# Patient Record
Sex: FEMALE | Race: ASIAN | NOT HISPANIC OR LATINO | Employment: OTHER | URBAN - METROPOLITAN AREA
[De-identification: names, ages, dates, MRNs, and addresses within clinical notes are randomized per-mention and may not be internally consistent; named-entity substitution may affect disease eponyms.]

---

## 2017-04-20 ENCOUNTER — OFFICE VISIT (OUTPATIENT)
Dept: OPHTHALMOLOGY | Facility: CLINIC | Age: 82
End: 2017-04-20
Payer: COMMERCIAL

## 2017-04-20 DIAGNOSIS — H52.4 PRESBYOPIA: ICD-10-CM

## 2017-04-20 DIAGNOSIS — Z96.1 PSEUDOPHAKIA: ICD-10-CM

## 2017-04-20 DIAGNOSIS — H26.9 CATARACT: Primary | ICD-10-CM

## 2017-04-20 PROCEDURE — 92014 COMPRE OPH EXAM EST PT 1/>: CPT | Performed by: OPHTHALMOLOGY

## 2017-04-20 PROCEDURE — 92015 DETERMINE REFRACTIVE STATE: CPT | Performed by: OPHTHALMOLOGY

## 2017-04-20 ASSESSMENT — CONF VISUAL FIELD
OS_NORMAL: 1
OD_NORMAL: 1

## 2017-04-20 ASSESSMENT — REFRACTION_WEARINGRX
OS_SPHERE: +0.75
OD_ADD: +2.75
OS_AXIS: 031
OD_AXIS: 150
OS_ADD: +2.75
OD_CYLINDER: +0.25
SPECS_TYPE: BIFOCAL
OD_SPHERE: -0.75
OS_CYLINDER: +1.00

## 2017-04-20 ASSESSMENT — REFRACTION_MANIFEST
OS_AXIS: 030
OS_ADD: +3.00
OS_SPHERE: +0.75
OD_SPHERE: -1.00
OD_ADD: +3.00
OD_CYLINDER: +0.25
OD_AXIS: 155
OS_CYLINDER: +1.50

## 2017-04-20 ASSESSMENT — VISUAL ACUITY
METHOD: SNELLEN - LINEAR
OD_CC: 20/20
OS_CC: 20/20
OS_CC+: -2
CORRECTION_TYPE: GLASSES

## 2017-04-20 ASSESSMENT — SLIT LAMP EXAM - LIDS
COMMENTS: 2+ DERMATOCHALASIS - UPPER LID
COMMENTS: 2+ DERMATOCHALASIS - UPPER LID

## 2017-04-20 ASSESSMENT — EXTERNAL EXAM - RIGHT EYE: OD_EXAM: NORMAL

## 2017-04-20 ASSESSMENT — CUP TO DISC RATIO
OS_RATIO: 0.4
OD_RATIO: 0.4

## 2017-04-20 ASSESSMENT — TONOMETRY
OS_IOP_MMHG: 16
OD_IOP_MMHG: 15
IOP_METHOD: APPLANATION

## 2017-04-20 ASSESSMENT — EXTERNAL EXAM - LEFT EYE: OS_EXAM: MILD BROW

## 2017-04-20 NOTE — PROGRESS NOTES
Current Eye Medications: Systane twice a day , sometimes more.     Subjective:  Here for complete today.  Blurs when she reads sometimes, eyes tire.  Optical shop was a bad experience, they told her to tell the tech that she wants a normal pair of glasses.   Not sure what this means.      Objective:  See Ophthalmology Exam.       Assessment:  Stable eye exam.  Cataract left eye not yet visually significant.      ICD-10-CM    1. Cataract, mild-mod, os H26.9 EYE EXAM (SIMPLE-NONBILLABLE)   2. Pseudophakia, od Z96.1    3. Presbyopia H52.4 REFRACTIVE STATUS        Plan: Possible clouding of posterior capsule discussed.   Possible posterior vitreous detachment (sudden onset large floater and/or flashing lights) discussed.   Glasses Rx given - optional   Use artificial tears up to 4 times daily both eyes. (Refresh Tears or Systane Ultra/Balance)   Call in December 2017 for an appointment in April 2018 for Complete Exam    Dr. Paige [phone]

## 2017-04-20 NOTE — PATIENT INSTRUCTIONS
Possible clouding of posterior capsule discussed.   Possible posterior vitreous detachment (sudden onset large floater and/or flashing lights) discussed.   Glasses Rx given - optional   Use artificial tears up to 4 times daily both eyes. (Refresh Tears or Systane Ultra/Balance)   Call in December 2017 for an appointment in April 2018 for Complete Exam    Dr. Paige [phone]

## 2017-04-20 NOTE — MR AVS SNAPSHOT
"              After Visit Summary   4/20/2017    Ana Garner    MRN: [id number]           Patient Information     Date Of Birth          1935        Visit Information        Provider Department      4/20/2017 9:30 AM Ravindra Paige MD [facility]        Today's Diagnoses     Presbyopia    -  1    Cataract, mild-mod, os        Pseudophakia, od          Care Instructions    Possible clouding of posterior capsule discussed.   Possible posterior vitreous detachment (sudden onset large floater and/or flashing lights) discussed.   Glasses Rx given - optional   Use artificial tears up to 4 times daily both eyes. (Refresh Tears or Systane Ultra/Balance)   Call in December 2017 for an appointment in April 2018 for Complete Exam    Dr. Paige [phone]        Follow-ups after your visit        Who to contact     If you have questions or need follow up information about today's clinic visit or your schedule please contact [facility] directly at [phone].  Normal or non-critical lab and imaging results will be communicated to you by Curoversehart, letter or phone within 4 business days after the clinic has received the results. If you do not hear from us within 7 days, please contact the clinic through Experts 911t or phone. If you have a critical or abnormal lab result, we will notify you by phone as soon as possible.  Submit refill requests through Motion[employer] or call your pharmacy and they will forward the refill request to us. Please allow 3 business days for your refill to be completed.          Additional Information About Your Visit        MyChart Information     Motionloft lets you send messages to your doctor, view your test results, renew your prescriptions, schedule appointments and more. To sign up, go to [email or web address] . Click on \"Log in\" on the left side of the screen, which will take you to the Welcome page. Then click on \"Sign up Now\" on the right side of the page.     You " will be asked to enter the access code listed below, as well as some personal information. Please follow the directions to create your username and password.     Your access code is: [email or web address]  Expires: 2017 10:49 AM     Your access code will  in 90 days. If you need help or a new code, please call your [place] clinic or [phone].        Care EveryWhere ID     This is your Care EveryWhere ID. This could be used by other organizations to access your [place] medical records  [id number]         Blood Pressure from Last 3 Encounters:   No data found for BP    Weight from Last 3 Encounters:   No data found for Wt              Today, you had the following     No orders found for display       Primary Care Provider Office Phone # Fax #    Darian Marina [phone]       [email or web address] [address] [facility] [address]  [facility] [address]        Thank you!     Thank you for choosing [facility]  for your care. Our goal is always to provide you with excellent care. Hearing back from our patients is one way we can continue to improve our services. Please take a few minutes to complete the written survey that you may receive in the mail after your visit with us. Thank you!             Your Updated Medication List - Protect others around you: Learn how to safely use, store and throw away your medicines at www.disposemymeds.org.          This list is accurate as of: 17 10:49 AM.  Always use your most recent med list.                   Brand Name Dispense Instructions for use    AF ALLERGY RELIEF PO      Take  by mouth.       ascorbic acid 500 MG tablet    VITAMIN C     Take 100 mg by mouth daily       B COMPLEX VITAMINS PLUS PO      Take  by mouth.       CALCIUM CITRATE PLUS PO      Take 1 tablet by mouth 3 times daily. Contains Magnesium, Zinc, B-6, Copper, Vit D3       cholecalciferol 1000 UNIT tablet    vitamin D     Take  by mouth.       CHOLEST OFF PO      Take  by  mouth.       hypromellose 0.3 % Gel ophthalmic gel    GENTEAL     Place 1 drop into both eyes 2 times daily       Levothyroxine Sodium 25 MCG Caps      Take  by mouth.       magnesium 250 MG tablet      Take 1 tablet by mouth daily.       OLIVE LEAF PO      Take 400 mg by mouth.       OMEGA-3 FISH OIL PO

## 2018-04-23 ENCOUNTER — OFFICE VISIT (OUTPATIENT)
Dept: OPHTHALMOLOGY | Facility: CLINIC | Age: 83
End: 2018-04-23
Payer: COMMERCIAL

## 2018-04-23 DIAGNOSIS — Z96.1 PSEUDOPHAKIA: Primary | ICD-10-CM

## 2018-04-23 DIAGNOSIS — H52.4 PRESBYOPIA: ICD-10-CM

## 2018-04-23 DIAGNOSIS — H25.812 COMBINED FORMS OF AGE-RELATED CATARACT OF LEFT EYE: ICD-10-CM

## 2018-04-23 PROCEDURE — 92014 COMPRE OPH EXAM EST PT 1/>: CPT | Performed by: OPHTHALMOLOGY

## 2018-04-23 PROCEDURE — 92015 DETERMINE REFRACTIVE STATE: CPT | Performed by: OPHTHALMOLOGY

## 2018-04-23 ASSESSMENT — REFRACTION_MANIFEST
OD_AXIS: 133
OD_ADD: +3.00
OD_SPHERE: -0.50
OD_CYLINDER: +0.25
OS_SPHERE: +0.75
OS_CYLINDER: +1.25
OS_ADD: +3.00
OS_AXIS: 018

## 2018-04-23 ASSESSMENT — REFRACTION_WEARINGRX
OS_SPHERE: +0.75
OS_AXIS: 033
OD_AXIS: 140
OS_CYLINDER: +1.25
OD_SPHERE: -0.75
SPECS_TYPE: BIFOCAL
OS_ADD: +3.00
OD_CYLINDER: +0.25
OD_ADD: +3.00

## 2018-04-23 ASSESSMENT — VISUAL ACUITY
OS_CC: 2
OD_CC+: -3
OS_CC: 20/25
OD_CC: 20/20
METHOD: SNELLEN - LINEAR
CORRECTION_TYPE: GLASSES
OD_CC: 2
OS_CC+: -3

## 2018-04-23 ASSESSMENT — TONOMETRY
OD_IOP_MMHG: 15
IOP_METHOD: APPLANATION
OS_IOP_MMHG: 15

## 2018-04-23 ASSESSMENT — SLIT LAMP EXAM - LIDS
COMMENTS: 2+ DERMATOCHALASIS - UPPER LID
COMMENTS: 2+ DERMATOCHALASIS - UPPER LID

## 2018-04-23 ASSESSMENT — EXTERNAL EXAM - RIGHT EYE: OD_EXAM: NORMAL

## 2018-04-23 ASSESSMENT — CONF VISUAL FIELD
OD_NORMAL: 1
OS_NORMAL: 1

## 2018-04-23 ASSESSMENT — EXTERNAL EXAM - LEFT EYE: OS_EXAM: MILD BROW

## 2018-04-23 ASSESSMENT — CUP TO DISC RATIO
OS_RATIO: 0.4
OD_RATIO: 0.4

## 2018-04-23 NOTE — PATIENT INSTRUCTIONS
Possible clouding of posterior capsule discussed right eye.  Possible posterior vitreous detachment (sudden onset large floater and/or flashing lights) discussed.   Use artificial tears up to 4 times daily both eyes. (Refresh Tears, Systane Ultra/Balance, or Theratears)   Glasses Rx given - optional   Call in December 2018 for an appointment in April 2019 for Complete Exam.    Dr. Paige [phone]

## 2018-04-23 NOTE — PROGRESS NOTES
Current Eye Medications:  systane ulta both eyes twice a day.       Subjective:  Comprehensive Eye Exam.  Her frame may be broken (the temple).  She relates a delay in focusing after she has been reading, then looks across the room (she complained of this last year).       Objective:  See Ophthalmology Exam.       Assessment:  Stable eye exam.      ICD-10-CM    1. Pseudophakia, od Z96.1 EYE EXAM (SIMPLE-NONBILLABLE)   2. 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 Presbyopia H52.4 REFRACTIVE STATUS        Plan:  Possible clouding of posterior capsule discussed right eye.  Possible posterior vitreous detachment (sudden onset large floater and/or flashing lights) discussed.   Use artificial tears up to 4 times daily both eyes. (Refresh Tears, Systane Ultra/Balance, or Theratears)   Glasses Rx given - optional   Call in December 2018 for an appointment in April 2019 for Complete Exam.    Dr. Paige [phone]

## 2018-04-23 NOTE — MR AVS SNAPSHOT
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  Possible posterior vitreous detachment (sudden onset large floater and/or flashing lights) discussed.   Use artificial tears up to 4 times daily both eyes. (Refresh Tears, Systane Ultra/Balance, or Theratears)   Glasses Rx given - optional   Call in December 2018 for an appointment in April 2019 for Complete Exam.    Dr. Paige [phone]          Follow-ups after your visit        Who to contact     If you have questions or need follow up information about today's clinic visit or your schedule please contact [facility] directly at [phone].  Normal or non-critical lab and imaging results will be communicated to you by Fonemeshhart, letter or phone within 4 business days after the clinic has received the results. If you do not hear from us within 7 days, please contact the clinic through Fonemeshhart or phone. If you have a critical or abnormal lab result, we will notify you by phone as soon as possible.  Submit refill requests through [employer] or call your pharmacy and they will forward the refill request to us. Please allow 3 business days for your refill to be completed.          Additional Information About Your Visit        FonemeshharMela [employer]isans Information     [employer] lets you send messages to your doctor, view your test results, renew your prescriptions, schedule appointments and more. To sign up, go to [email or web address] . Click on \"Log in\" on the left side of the screen, which will take you to the Welcome page. Then " "click on \"Sign up Now\" on the right side of the page.     You will be asked to enter the access code listed below, as well as some personal information. Please follow the directions to create your username and password.     Your access code is: [email or web address]  Expires: 2018 10:48 AM     Your access code will  in 90 days. If you need help or a new code, please call your [place] clinic or [phone].        Care EveryWhere ID     This is your Care EveryWhere ID. This could be used by other organizations to access your [place] medical records  [id number]         Blood Pressure from Last 3 Encounters:   No data found for BP    Weight from Last 3 Encounters:   No data found for Wt              We Performed the Following     EYE EXAM (SIMPLE-NONBILLABLE)     REFRACTIVE STATUS        Primary Care Provider Office Phone # Fax #    Darian Marina [phone]       [facility] [address] [facility] [address]  [facility] [address]        Equal Access to Services     [facility]: Hadii aad ku hadasho Soomaali, waaxda luqadaha, qaybta kaalmada adeegyada, waxay idiin hayaan ruieg kharaonofre toussaint . So [facility] [phone].    ATENCIÓN: Si habla español, tiene a danielle disposición servicios gratuitos de asistencia lingüística. Llame al [phone].    We comply with applicable federal civil rights laws and [place] laws. We do not discriminate on the basis of race, color, national origin, age, disability, sex, sexual orientation, or gender identity.            Thank you!     Thank you for choosing [facility]  for your care. Our goal is always to provide you with excellent care. Hearing back from our patients is one way we can continue to improve our services. Please take a few minutes to complete the written survey that you may receive in the mail after your visit with us. Thank you!             Your Updated Medication List - Protect others around you: Learn how to safely use, store and " throw away your medicines at www.disposemymeds.org.          This list is accurate as of 4/23/18 10:48 AM.  Always use your most recent med list.                   Brand Name Dispense Instructions for use Diagnosis    AF ALLERGY RELIEF PO      Take  by mouth.        ascorbic acid 500 MG tablet    VITAMIN C     Take 100 mg by mouth daily        B COMPLEX VITAMINS PLUS PO      Take  by mouth.        CALCIUM CITRATE PLUS PO      Take 1 tablet by mouth 3 times daily. Contains Magnesium, Zinc, B-6, Copper, Vit D3        cholecalciferol 1000 UNIT tablet    vitamin D3     Take  by mouth.        CHOLEST OFF PO      Take  by mouth.    Myopia, Presbyopia, Hyperopia, Astigmatism       hypromellose 0.3 % Gel ophthalmic gel    GENTEAL     Place 1 drop into both eyes 2 times daily        Levothyroxine Sodium 25 MCG Caps      Take  by mouth.        magnesium 250 MG tablet      Take 1 tablet by mouth daily.        OLIVE LEAF PO      Take 400 mg by mouth.        OMEGA-3 FISH OIL PO

## 2018-04-23 NOTE — LETTER
4/23/2018         RE: Ana Garner  [address] [address]  [place] [address]        Dear Colleague,    Thank you for referring your patient, Ana Garner, to the [facility] [place] [facility]\"". Please see a copy of my visit note below.     Current Eye Medications:  systane ulta both eyes twice a day.       Subjective:  Comprehensive Eye Exam.  Her frame may be broken (the temple).  She relates a delay in focusing after she has been reading, then looks across the room (she complained of this last year).       Objective:  See Ophthalmology Exam.       Assessment:  Stable eye exam.      ICD-10-CM    1. Pseudophakia, od Z96.1 EYE EXAM (SIMPLE-NONBILLABLE)   2. 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 Presbyopia H52.4 REFRACTIVE STATUS        Plan:  Possible clouding of posterior capsule discussed right eye.  Possible posterior vitreous detachment (sudden onset large floater and/or flashing lights) discussed.   Use artificial tears up to 4 times daily both eyes. (Refresh Tears, Systane Ultra/Balance, or Theratears)   Glasses Rx given - optional   Call in December 2018 for an appointment in April 2019 for Complete Exam.    Dr. Paige [phone]         Again, thank you for allowing me to participate in the care of your patient.        Sincerely,        Ravindra Paige MD

## 2019-05-29 ENCOUNTER — OFFICE VISIT (OUTPATIENT)
Dept: OPHTHALMOLOGY | Facility: CLINIC | Age: 84
End: 2019-05-29
Payer: MEDICARE

## 2019-05-29 DIAGNOSIS — H52.4 PRESBYOPIA: ICD-10-CM

## 2019-05-29 DIAGNOSIS — H25.812 COMBINED FORMS OF AGE-RELATED CATARACT OF LEFT EYE: Primary | ICD-10-CM

## 2019-05-29 DIAGNOSIS — Z96.1 PSEUDOPHAKIA: ICD-10-CM

## 2019-05-29 PROBLEM — R41.3 MEMORY LOSS: Status: ACTIVE | Noted: 2018-10-18

## 2019-05-29 PROBLEM — R53.83 OTHER MALAISE AND FATIGUE: Status: ACTIVE | Noted: 2018-10-18

## 2019-05-29 PROBLEM — R63.4 WEIGHT LOSS: Status: ACTIVE | Noted: 2018-10-18

## 2019-05-29 PROBLEM — F51.01 PRIMARY INSOMNIA: Status: ACTIVE | Noted: 2017-10-25

## 2019-05-29 PROBLEM — M81.0 AGE-RELATED OSTEOPOROSIS WITHOUT CURRENT PATHOLOGICAL FRACTURE: Status: ACTIVE | Noted: 2018-10-18

## 2019-05-29 PROBLEM — E78.2 MIXED HYPERLIPIDEMIA: Status: ACTIVE | Noted: 2017-10-25

## 2019-05-29 PROBLEM — D72.819 LEUKOCYTOPENIA: Status: ACTIVE | Noted: 2018-10-18

## 2019-05-29 PROBLEM — H91.90 HEARING LOSS: Status: ACTIVE | Noted: 2018-10-18

## 2019-05-29 PROBLEM — E03.9 ACQUIRED HYPOTHYROIDISM: Status: ACTIVE | Noted: 2018-10-18

## 2019-05-29 PROBLEM — B02.29 HERPES ZOSTER WITH NERVOUS SYSTEM COMPLICATIONS: Status: ACTIVE | Noted: 2018-10-18

## 2019-05-29 PROBLEM — G60.9 IDIOPATHIC PERIPHERAL NEUROPATHY: Status: ACTIVE | Noted: 2018-10-18

## 2019-05-29 PROBLEM — R53.81 OTHER MALAISE AND FATIGUE: Status: ACTIVE | Noted: 2018-10-18

## 2019-05-29 PROCEDURE — 92014 COMPRE OPH EXAM EST PT 1/>: CPT | Performed by: OPHTHALMOLOGY

## 2019-05-29 PROCEDURE — 92015 DETERMINE REFRACTIVE STATE: CPT | Mod: GY | Performed by: OPHTHALMOLOGY

## 2019-05-29 ASSESSMENT — REFRACTION_WEARINGRX
OD_AXIS: 138
OD_CYLINDER: +0.25
OS_AXIS: 020
OS_ADD: +3.25
OS_SPHERE: +0.50
OD_ADD: +3.25
OD_SPHERE: -0.75
OS_CYLINDER: +1.50
SPECS_TYPE: BIFOCAL

## 2019-05-29 ASSESSMENT — CUP TO DISC RATIO
OS_RATIO: 0.4
OD_RATIO: 0.4

## 2019-05-29 ASSESSMENT — EXTERNAL EXAM - LEFT EYE: OS_EXAM: MILD BROW

## 2019-05-29 ASSESSMENT — REFRACTION_MANIFEST
OS_SPHERE: +1.00
OS_ADD: +3.00
OD_AXIS: 150
OD_CYLINDER: +0.50
OD_SPHERE: -0.75
OS_AXIS: 028
OS_CYLINDER: +1.50
OD_ADD: +3.00

## 2019-05-29 ASSESSMENT — VISUAL ACUITY
OD_CC: 2
CORRECTION_TYPE: GLASSES
METHOD: SNELLEN - LINEAR
OS_CC: 2
OS_CC: 20/40
OS_PH_CC: 20/30
OD_CC+: +2
OD_CC: 20/30
OS_CC+: -1
OS_PH_CC+: -1

## 2019-05-29 ASSESSMENT — CONF VISUAL FIELD
OD_NORMAL: 1
OS_NORMAL: 1

## 2019-05-29 ASSESSMENT — SLIT LAMP EXAM - LIDS
COMMENTS: 2+ DERMATOCHALASIS - UPPER LID
COMMENTS: 2+ DERMATOCHALASIS - UPPER LID

## 2019-05-29 ASSESSMENT — TONOMETRY
OS_IOP_MMHG: 14
OD_IOP_MMHG: 15
IOP_METHOD: APPLANATION

## 2019-05-29 ASSESSMENT — EXTERNAL EXAM - RIGHT EYE: OD_EXAM: MILD BROW

## 2019-05-29 NOTE — LETTER
5/29/2019         RE: Ana Garner  [address]  [place] [address]        Dear Colleague,    Thank you for referring your patient, Ana Garner[facility]. Please see a copy of my visit note below.     Current Eye Medications:  systane both eyes twice a day.       Subjective:  Comprehensive Eye Exam.  She continues to have a delay of focusing after reading for a period of time.  Otherwise, vision appears to be about the same in each eye, with correction.      Objective:  See Ophthalmology Exam.       Assessment:  Stable eye exam.      ICD-10-CM    1. Combined forms of age-related cataract, mild-mod, left eye H25.812 EYE EXAM (SIMPLE-NONBILLABLE)   2. Pseudophakia, od Z96.1    3. Presbyopia H52.4 REFRACTIVE STATUS        Plan:  Glasses Rx given - optional.  Use artificial tears up to 4 times daily both eyes.  (Refresh Tears, Systane Ultra/Balance, or Theratears)  Possible posterior vitreous detachment (sudden onset large floater and/or flashing lights) both eyes discussed.  Possible clouding of posterior capsule right eye discussed.  Call in January 2020 for an appointment in May 2020 for Complete Exam.    Dr. Paige [phone]         Again, thank you for allowing me to participate in the care of your patient.        Sincerely,        Ravindra Paige MD

## 2019-05-29 NOTE — PROGRESS NOTES
Current Eye Medications:  systane both eyes twice a day.       Subjective:  Comprehensive Eye Exam.  She continues to have a delay of focusing after reading for a period of time.  Otherwise, vision appears to be about the same in each eye, with correction.      Objective:  See Ophthalmology Exam.       Assessment:  Stable eye exam.      ICD-10-CM    1. Combined forms of age-related cataract, mild-mod, left eye H25.812 EYE EXAM (SIMPLE-NONBILLABLE)   2. Pseudophakia, od Z96.1    3. Presbyopia H52.4 REFRACTIVE STATUS        Plan:  Glasses Rx given - optional.  Use artificial tears up to 4 times daily both eyes.  (Refresh Tears, Systane Ultra/Balance, or Theratears)  Possible posterior vitreous detachment (sudden onset large floater and/or flashing lights) both eyes discussed.  Possible clouding of posterior capsule right eye discussed.  Call in January 2020 for an appointment in May 2020 for Complete Exam.    Dr. Paige [phone]

## 2019-05-29 NOTE — PATIENT INSTRUCTIONS
Glasses Rx given - optional.  Use artificial tears up to 4 times daily both eyes.  (Refresh Tears, Systane Ultra/Balance, or Theratears)  Possible posterior vitreous detachment (sudden onset large floater and/or flashing lights) both eyes discussed.  Possible clouding of posterior capsule right eye discussed.  Call in January 2020 for an appointment in May 2020 for Complete Exam.    Dr. Paige [phone]

## 2019-11-07 ENCOUNTER — DOCUMENTATION ONLY (OUTPATIENT)
Dept: OPHTHALMOLOGY | Facility: CLINIC | Age: 84
End: 2019-11-07

## 2019-11-22 ENCOUNTER — OFFICE VISIT (OUTPATIENT)
Dept: OPHTHALMOLOGY | Facility: CLINIC | Age: 84
End: 2019-11-22
Payer: MEDICARE

## 2019-11-22 DIAGNOSIS — H25.812 COMBINED FORMS OF AGE-RELATED CATARACT OF LEFT EYE: ICD-10-CM

## 2019-11-22 DIAGNOSIS — H53.9 VISUAL DISTURBANCE: Primary | ICD-10-CM

## 2019-11-22 DIAGNOSIS — Z96.1 PSEUDOPHAKIA: ICD-10-CM

## 2019-11-22 PROCEDURE — 92012 INTRM OPH EXAM EST PATIENT: CPT | Performed by: OPHTHALMOLOGY

## 2019-11-22 ASSESSMENT — VISUAL ACUITY
CORRECTION_TYPE: GLASSES
OD_CC: 20/20
METHOD: SNELLEN - LINEAR
OS_CC: 20/25

## 2019-11-22 ASSESSMENT — TONOMETRY
OS_IOP_MMHG: 14
OD_IOP_MMHG: 14
IOP_METHOD: APPLANATION

## 2019-11-22 ASSESSMENT — EXTERNAL EXAM - LEFT EYE: OS_EXAM: MILD BROW

## 2019-11-22 ASSESSMENT — CONF VISUAL FIELD
OD_NORMAL: 1
OS_NORMAL: 1

## 2019-11-22 ASSESSMENT — CUP TO DISC RATIO
OD_RATIO: 0.4
OS_RATIO: 0.4

## 2019-11-22 ASSESSMENT — EXTERNAL EXAM - RIGHT EYE: OD_EXAM: MILD BROW

## 2019-11-22 ASSESSMENT — SLIT LAMP EXAM - LIDS
COMMENTS: 2+ DERMATOCHALASIS - UPPER LID
COMMENTS: 2+ DERMATOCHALASIS - UPPER LID

## 2019-11-22 NOTE — PATIENT INSTRUCTIONS
Possible posterior vitreous detachment (sudden onset large floater and/or flashing lights) both eyes discussed.  Signs and symptoms of retinal detachment (shower of black floaters, frequent flashing even during day, curtain over part of visual field) discussed.  Call in January 2020 for an appointment in May 2020 for Complete Exam as previously planned.     Dr. Paige [phone]

## 2019-11-22 NOTE — PROGRESS NOTES
Current Eye Medications:  None     Subjective:  Patient woke up this am at 4:30am with the right eye painful and flashing lights.  Patient went back to sleep and when re awoke, symptoms had disappeared.  No visual complaints and is unchanged.    No previous episodes.     Objective:  See Ophthalmology Exam.       Assessment:  Migraine equivalent or temporary vascular event?      Plan:  Could consider 81 mg daily or every other day if OK with PCP.  Possible posterior vitreous detachment (sudden onset large floater and/or flashing lights) both eyes discussed.  Signs and symptoms of retinal detachment (shower of black floaters, frequent flashing even during day, curtain over part of visual field) discussed.  Call in January 2020 for an appointment in May 2020 for Complete Exam as previously planned.     Dr. Paige [phone]

## 2019-11-22 NOTE — LETTER
11/22/2019         RE: Ana Garner  [address]  [place] [address]        Dear Colleague,    Thank you for referring your patient, Ana Garner, to the [facility]. Please see a copy of my visit note below.     Current Eye Medications:  None     Subjective:  Patient woke up this am at 4:30am with the right eye painful and flashing lights.  Patient went back to sleep and when re awoke, symptoms had disappeared.  No visual complaints and is unchanged.    No previous episodes.     Objective:  See Ophthalmology Exam.       Assessment:  Migraine equivalent or temporary vascular event?      Plan:  Could consider 81 mg daily or every other day if OK with PCP.  Possible posterior vitreous detachment (sudden onset large floater and/or flashing lights) both eyes discussed.  Signs and symptoms of retinal detachment (shower of black floaters, frequent flashing even during day, curtain over part of visual field) discussed.  Call in January 2020 for an appointment in May 2020 for Complete Exam as previously planned.     Dr. Paige [phone]               Again, thank you for allowing me to participate in the care of your patient.        Sincerely,        Ravindra Paige MD

## 2020-08-05 ENCOUNTER — TELEPHONE (OUTPATIENT)
Dept: OPHTHALMOLOGY | Facility: CLINIC | Age: 85
End: 2020-08-05

## 2020-08-05 NOTE — TELEPHONE ENCOUNTER
Called patient to reschedule CE with Dr. Paige. Patient didn't feel comfortable with Covid at this time to schedule. Will call us back when she is ready.

## 2021-02-11 ENCOUNTER — IMMUNIZATION (OUTPATIENT)
Dept: PEDIATRICS | Facility: CLINIC | Age: 86
End: 2021-02-11
Payer: MEDICARE

## 2021-02-11 PROCEDURE — 91300 PR COVID VAC PFIZER DIL RECON 30 MCG/0.3 ML IM: CPT

## 2021-02-11 PROCEDURE — 0001A PR COVID VAC PFIZER DIL RECON 30 MCG/0.3 ML IM: CPT

## 2021-02-14 ENCOUNTER — HEALTH MAINTENANCE LETTER (OUTPATIENT)
Age: 86
End: 2021-02-14

## 2021-03-04 ENCOUNTER — IMMUNIZATION (OUTPATIENT)
Dept: PEDIATRICS | Facility: CLINIC | Age: 86
End: 2021-03-04
Attending: INTERNAL MEDICINE
Payer: MEDICARE

## 2021-03-04 PROCEDURE — 0002A PR COVID VAC PFIZER DIL RECON 30 MCG/0.3 ML IM: CPT

## 2021-03-04 PROCEDURE — 91300 PR COVID VAC PFIZER DIL RECON 30 MCG/0.3 ML IM: CPT

## 2021-05-27 ENCOUNTER — OFFICE VISIT (OUTPATIENT)
Dept: OPHTHALMOLOGY | Facility: CLINIC | Age: 86
End: 2021-05-27
Payer: MEDICARE

## 2021-05-27 DIAGNOSIS — H52.4 PRESBYOPIA: ICD-10-CM

## 2021-05-27 DIAGNOSIS — H25.812 COMBINED FORMS OF AGE-RELATED CATARACT OF LEFT EYE: Primary | ICD-10-CM

## 2021-05-27 DIAGNOSIS — Z96.1 PSEUDOPHAKIA: ICD-10-CM

## 2021-05-27 DIAGNOSIS — Z01.01 ENCOUNTER FOR EXAMINATION OF EYES AND VISION WITH ABNORMAL FINDINGS: ICD-10-CM

## 2021-05-27 PROCEDURE — 92014 COMPRE OPH EXAM EST PT 1/>: CPT | Performed by: OPHTHALMOLOGY

## 2021-05-27 PROCEDURE — 92015 DETERMINE REFRACTIVE STATE: CPT | Mod: GY | Performed by: OPHTHALMOLOGY

## 2021-05-27 SDOH — HEALTH STABILITY: MENTAL HEALTH: HOW OFTEN DO YOU HAVE A DRINK CONTAINING ALCOHOL?: NEVER

## 2021-05-27 ASSESSMENT — CUP TO DISC RATIO
OD_RATIO: 0.4
OS_RATIO: 0.4

## 2021-05-27 ASSESSMENT — REFRACTION_WEARINGRX
OS_AXIS: 019
OS_ADD: +3.25
OD_AXIS: 110
OD_CYLINDER: +0.25
OD_ADD: +3.25
OS_SPHERE: +0.50
OS_CYLINDER: +1.25
OD_SPHERE: -0.75
SPECS_TYPE: BIFOCAL

## 2021-05-27 ASSESSMENT — TONOMETRY
IOP_METHOD: APPLANATION
OD_IOP_MMHG: 13
OS_IOP_MMHG: 15

## 2021-05-27 ASSESSMENT — EXTERNAL EXAM - LEFT EYE: OS_EXAM: MILD BROW

## 2021-05-27 ASSESSMENT — SLIT LAMP EXAM - LIDS
COMMENTS: 2+ DERMATOCHALASIS - UPPER LID
COMMENTS: 2+ DERMATOCHALASIS - UPPER LID

## 2021-05-27 ASSESSMENT — REFRACTION_MANIFEST
OS_AXIS: 020
OD_AXIS: 165
OS_CYLINDER: +1.50
OD_CYLINDER: +0.75
OD_ADD: +3.25
OS_SPHERE: +0.50
OD_SPHERE: -0.75
OS_ADD: +3.25

## 2021-05-27 ASSESSMENT — CONF VISUAL FIELD
OD_NORMAL: 1
METHOD: COUNTING FINGERS
OS_NORMAL: 1

## 2021-05-27 ASSESSMENT — VISUAL ACUITY
OD_CC: 20/20
OS_CC: 20/25
METHOD: SNELLEN - LINEAR
OD_CC+: -2
OS_CC+: -1

## 2021-05-27 ASSESSMENT — EXTERNAL EXAM - RIGHT EYE: OD_EXAM: MILD BROW

## 2021-05-27 NOTE — LETTER
5/27/2021         RE: Ana Garner  [address]  [place] [address]        Dear Colleague,    Thank you for referring your patient, Ana Garner, to the [facility]. Please see a copy of my visit note below.     Current Eye Medications:  Systane twice a day both eyes      Subjective:  Complete eye exam. Vision is doing fine both eyes in distance and near. When reading for long time, when patient looks up in distance it is blurred for just a second or two. No eye pain or discomfort in either eye.    No further episodes of transient vision loss and photopsia.     Objective:  See Ophthalmology Exam.       Assessment:  Stable eye exam.      ICD-10-CM    1. Combined forms of age-related cataract, mild-mod, left eye  H25.812    2. Pseudophakia, od  Z96.1    3. Encounter for examination of eyes and vision with abnormal findings  Z01.01    4. Presbyopia  H52.4 EYE EXAM (SIMPLE-NONBILLABLE)     REFRACTION        Plan:  Glasses Rx given - optional.  Use artificial tears up to 4 times daily both eyes. (Refresh Tears, Systane Ultra/Balance, or Theratears).  Possible clouding of posterior capsule right eye discussed.  Possible posterior vitreous detachment (sudden onset large floater and/or flashing lights) both eyes discussed.  Call in January 2022 for an appointment in May 2022 for Complete Exam    Dr. Paige [phone]               Again, thank you for allowing me to participate in the care of your patient.        Sincerely,        Ravindra Paige MD

## 2021-05-27 NOTE — PROGRESS NOTES
Current Eye Medications:  Systane twice a day both eyes      Subjective:  Complete eye exam. Vision is doing fine both eyes in distance and near. When reading for long time, when patient looks up in distance it is blurred for just a second or two. No eye pain or discomfort in either eye.    No further episodes of transient vision loss and photopsia.     Objective:  See Ophthalmology Exam.       Assessment:  Stable eye exam.      ICD-10-CM    1. Combined forms of age-related cataract, mild-mod, left eye  H25.812    2. Pseudophakia, od  Z96.1    3. Encounter for examination of eyes and vision with abnormal findings  Z01.01    4. Presbyopia  H52.4 EYE EXAM (SIMPLE-NONBILLABLE)     REFRACTION        Plan:  Glasses Rx given - optional.  Use artificial tears up to 4 times daily both eyes. (Refresh Tears, Systane Ultra/Balance, or Theratears).  Possible clouding of posterior capsule right eye discussed.  Possible posterior vitreous detachment (sudden onset large floater and/or flashing lights) both eyes discussed.  Call in January 2022 for an appointment in May 2022 for Complete Exam    Dr. Paige [phone]

## 2021-05-27 NOTE — PATIENT INSTRUCTIONS
Glasses Rx given - optional.  Use artificial tears up to 4 times daily both eyes. (Refresh Tears, Systane Ultra/Balance, or Theratears).  Possible clouding of posterior capsule right eye discussed.  Possible posterior vitreous detachment (sudden onset large floater and/or flashing lights) both eyes discussed.  Call in January 2022 for an appointment in May 2022 for Complete Exam    Dr. Paige [phone]

## 2021-09-19 ENCOUNTER — HEALTH MAINTENANCE LETTER (OUTPATIENT)
Age: 86
End: 2021-09-19

## 2022-03-06 ENCOUNTER — HEALTH MAINTENANCE LETTER (OUTPATIENT)
Age: 87
End: 2022-03-06

## 2022-06-01 ENCOUNTER — OFFICE VISIT (OUTPATIENT)
Dept: OPHTHALMOLOGY | Facility: CLINIC | Age: 87
End: 2022-06-01
Payer: COMMERCIAL

## 2022-06-01 DIAGNOSIS — H52.4 PRESBYOPIA: ICD-10-CM

## 2022-06-01 DIAGNOSIS — H25.812 COMBINED FORMS OF AGE-RELATED CATARACT OF LEFT EYE: Primary | ICD-10-CM

## 2022-06-01 DIAGNOSIS — Z01.01 ENCOUNTER FOR EXAMINATION OF EYES AND VISION WITH ABNORMAL FINDINGS: ICD-10-CM

## 2022-06-01 DIAGNOSIS — Z96.1 PSEUDOPHAKIA: ICD-10-CM

## 2022-06-01 PROCEDURE — 92014 COMPRE OPH EXAM EST PT 1/>: CPT | Performed by: OPHTHALMOLOGY

## 2022-06-01 PROCEDURE — 92015 DETERMINE REFRACTIVE STATE: CPT | Performed by: OPHTHALMOLOGY

## 2022-06-01 ASSESSMENT — VISUAL ACUITY
METHOD: SNELLEN - LINEAR
OS_CC: 20/30
OD_CC: 20/20
CORRECTION_TYPE: GLASSES

## 2022-06-01 ASSESSMENT — REFRACTION_MANIFEST
OS_SPHERE: +0.50
OS_CYLINDER: +1.75
OS_AXIS: 017
OD_ADD: +3.25
OD_AXIS: 160
OD_CYLINDER: +0.50
OS_ADD: +3.25
OD_SPHERE: -0.75

## 2022-06-01 ASSESSMENT — REFRACTION_WEARINGRX
OS_ADD: +3.25
OS_AXIS: 019
OD_ADD: +3.25
OD_SPHERE: -0.75
OS_CYLINDER: +1.25
OS_SPHERE: +0.50
SPECS_TYPE: BIFOCAL
OD_CYLINDER: +0.25
OD_AXIS: 110

## 2022-06-01 ASSESSMENT — CUP TO DISC RATIO
OD_RATIO: 0.4
OS_RATIO: 0.4

## 2022-06-01 ASSESSMENT — SLIT LAMP EXAM - LIDS
COMMENTS: 2+ DERMATOCHALASIS - UPPER LID
COMMENTS: 2+ DERMATOCHALASIS - UPPER LID

## 2022-06-01 ASSESSMENT — TONOMETRY
OD_IOP_MMHG: 14
OS_IOP_MMHG: 14
IOP_METHOD: APPLANATION

## 2022-06-01 ASSESSMENT — CONF VISUAL FIELD
OD_NORMAL: 1
OS_NORMAL: 1

## 2022-06-01 ASSESSMENT — EXTERNAL EXAM - RIGHT EYE: OD_EXAM: MILD BROW

## 2022-06-01 ASSESSMENT — EXTERNAL EXAM - LEFT EYE: OS_EXAM: MILD BROW

## 2022-06-01 NOTE — PROGRESS NOTES
Current Eye Medications:  systane BID both eyes     Subjective:  Here for complete eye exam today. Takes drops for dryness, helps.      Objective:  See Ophthalmology Exam.       Assessment:  Stable eye exam.  Cataract left eye probably visually significant but happy with current visual status.      ICD-10-CM    1. Combined forms of age-related cataract, mod, left eye  H25.812 EYE EXAM (SIMPLE-NONBILLABLE)   2. Pseudophakia, od  Z96.1 EYE EXAM (SIMPLE-NONBILLABLE)   3. Encounter for examination of eyes and vision with abnormal findings  Z01.01    4. Presbyopia  H52.4 REFRACTIVE STATUS        Plan:  Possible clouding of posterior capsule right eye discussed.   Possible posterior vitreous detachment (sudden onset large floater and/or flashing lights) both eyes discussed.   May use artificial tears up to 4 times daily both eyes. (Refresh Tears, Systane Ultra/Balance, or Theratears)   Glasses Rx given - optional   Call in February 2023 for an appointment in June 2023 for Complete Exam    Dr. Paige [phone]

## 2022-06-01 NOTE — LETTER
6/1/2022         RE: Ana Garner  [address]  [place] [address]        Dear Colleague,    Thank you for referring your patient, Ana Garner, to the [facility]. Please see a copy of my visit note below.     Current Eye Medications:  systane BID both eyes     Subjective:  Here for complete eye exam today. Takes drops for dryness, helps.      Objective:  See Ophthalmology Exam.       Assessment:  Stable eye exam.  Cataract left eye probably visually significant but happy with current visual status.      ICD-10-CM    1. Combined forms of age-related cataract, mod, left eye  H25.812 EYE EXAM (SIMPLE-NONBILLABLE)   2. Pseudophakia, od  Z96.1 EYE EXAM (SIMPLE-NONBILLABLE)   3. Encounter for examination of eyes and vision with abnormal findings  Z01.01    4. Presbyopia  H52.4 REFRACTIVE STATUS        Plan:  Possible clouding of posterior capsule right eye discussed.   Possible posterior vitreous detachment (sudden onset large floater and/or flashing lights) both eyes discussed.   May use artificial tears up to 4 times daily both eyes. (Refresh Tears, Systane Ultra/Balance, or Theratears)   Glasses Rx given - optional   Call in February 2023 for an appointment in June 2023 for Complete Exam    Dr. Paige [phone]          Again, thank you for allowing me to participate in the care of your patient.        Sincerely,        Ravindra Paige MD

## 2022-06-01 NOTE — PATIENT INSTRUCTIONS
Possible clouding of posterior capsule right eye discussed.   Possible posterior vitreous detachment (sudden onset large floater and/or flashing lights) both eyes discussed.   May use artificial tears up to 4 times daily both eyes. (Refresh Tears, Systane Ultra/Balance, or Theratears)   Glasses Rx given - optional   Call in February 2023 for an appointment in June 2023 for Complete Exam    Dr. Paige [phone]

## 2022-11-21 ENCOUNTER — HEALTH MAINTENANCE LETTER (OUTPATIENT)
Age: 87
End: 2022-11-21

## 2023-04-17 ENCOUNTER — TELEPHONE (OUTPATIENT)
Dept: OPHTHALMOLOGY | Facility: CLINIC | Age: 88
End: 2023-04-17
Payer: COMMERCIAL

## 2023-04-17 NOTE — CONFIDENTIAL NOTE
Called patient back. Patient would like to come in sooner to see Dr. Paige because she will be out of town for months in the summer. I moved patients appointment to 05/05/2023. Patient is checking with daughter to see if that time and date are okay and will call back if she needs to reschedule.

## 2023-04-17 NOTE — TELEPHONE ENCOUNTER
[facility] Call Center    Phone Message    May a detailed message be left on voicemail: yes     Reason for Call: Other: Pt would like to know if there will be any glaucoma testing done at her upcoming appt. She said that insurance will now cover 2 appts per year so she can make the appt sooner if there is no glaucoma testing, otherwise she will have to keep the current time. Please review and contact pt to discuss    Thank you      Action Taken: Message routed to:  [facility] eye    Travel Screening: Not Applicable

## 2023-05-05 ENCOUNTER — OFFICE VISIT (OUTPATIENT)
Dept: OPHTHALMOLOGY | Facility: CLINIC | Age: 88
End: 2023-05-05
Payer: COMMERCIAL

## 2023-05-05 DIAGNOSIS — H52.4 PRESBYOPIA: ICD-10-CM

## 2023-05-05 DIAGNOSIS — Z01.01 ENCOUNTER FOR EXAMINATION OF EYES AND VISION WITH ABNORMAL FINDINGS: ICD-10-CM

## 2023-05-05 DIAGNOSIS — H25.812 COMBINED FORMS OF AGE-RELATED CATARACT OF LEFT EYE: Primary | ICD-10-CM

## 2023-05-05 DIAGNOSIS — Z96.1 PSEUDOPHAKIA: ICD-10-CM

## 2023-05-05 PROCEDURE — 92015 DETERMINE REFRACTIVE STATE: CPT | Performed by: OPHTHALMOLOGY

## 2023-05-05 PROCEDURE — 92014 COMPRE OPH EXAM EST PT 1/>: CPT | Performed by: OPHTHALMOLOGY

## 2023-05-05 RX ORDER — LEVOTHYROXINE SODIUM 25 UG/1
1 TABLET ORAL
COMMUNITY
Start: 2023-04-13

## 2023-05-05 ASSESSMENT — CONF VISUAL FIELD
OS_INFERIOR_NASAL_RESTRICTION: 0
OS_SUPERIOR_NASAL_RESTRICTION: 0
OS_INFERIOR_TEMPORAL_RESTRICTION: 0
OD_NORMAL: 1
OD_INFERIOR_NASAL_RESTRICTION: 0
OD_SUPERIOR_TEMPORAL_RESTRICTION: 0
OS_SUPERIOR_TEMPORAL_RESTRICTION: 0
OD_SUPERIOR_NASAL_RESTRICTION: 0
OD_INFERIOR_TEMPORAL_RESTRICTION: 0
OS_NORMAL: 1

## 2023-05-05 ASSESSMENT — VISUAL ACUITY
OS_CC: 20/30
CORRECTION_TYPE: GLASSES
METHOD: SNELLEN - LINEAR
OS_CC: J1
OD_CC: J1+
OD_CC: 20/20

## 2023-05-05 ASSESSMENT — CUP TO DISC RATIO
OD_RATIO: 0.4
OS_RATIO: 0.4

## 2023-05-05 ASSESSMENT — REFRACTION_WEARINGRX
OD_SPHERE: -0.75
OS_CYLINDER: +1.25
OS_ADD: +3.25
OS_AXIS: 018
OD_ADD: +3.25
OD_CYLINDER: +0.50
OS_SPHERE: +0.50
SPECS_TYPE: BIFOCAL
OD_AXIS: 140

## 2023-05-05 ASSESSMENT — REFRACTION_MANIFEST
OS_CYLINDER: +2.00
OS_AXIS: 017
OD_ADD: +3.00
OS_ADD: +3.00
OS_SPHERE: +0.50
OD_SPHERE: -1.00
OD_CYLINDER: +0.75
OD_AXIS: 145

## 2023-05-05 ASSESSMENT — TONOMETRY
OS_IOP_MMHG: 15
OD_IOP_MMHG: 13
IOP_METHOD: APPLANATION

## 2023-05-05 ASSESSMENT — SLIT LAMP EXAM - LIDS
COMMENTS: 2+ DERMATOCHALASIS - UPPER LID
COMMENTS: 2+ DERMATOCHALASIS - UPPER LID

## 2023-05-05 ASSESSMENT — EXTERNAL EXAM - RIGHT EYE: OD_EXAM: MILD BROW

## 2023-05-05 ASSESSMENT — EXTERNAL EXAM - LEFT EYE: OS_EXAM: MILD BROW

## 2023-05-05 NOTE — PATIENT INSTRUCTIONS
Glasses Rx given - optional  Possible clouding of posterior capsule right eye discussed.   Possible posterior vitreous detachment (sudden onset large floater and/or flashing lights) both eyes discussed.   May use artificial tears up to 4 times daily both eyes. (Refresh Tears, Systane Ultra/Balance, or Theratears)  Call in January 2024 for an appointment in May 2024 for Complete Exam    Dr. Paige [phone]

## 2023-05-05 NOTE — LETTER
5/5/2023         RE: Ana Garner  [address]  [place] [address]        Dear Colleague,    Thank you for referring your patient, Ana Garner, to the [facility]. Please see a copy of my visit note below.     Current Eye Medications:  systane both eyes twice a day or more.     Subjective:  Comprehensive Eye Exam.  She has been complaining for a long time of a delay in focusing when she looks in the distance, after reading for an extended amount of time.  Overall, vision is good, distance and and near.      Objective:  See Ophthalmology Exam.       Assessment:  Stable eye exam.      ICD-10-CM    1. Combined forms of age-related cataract, mod, left eye  H25.812       2. Pseudophakia, od  Z96.1       3. Encounter for examination of eyes and vision with abnormal findings  Z01.01       4. Presbyopia  H52.4            Plan:  Glasses Rx given - optional  Possible clouding of posterior capsule right eye discussed.   Possible posterior vitreous detachment (sudden onset large floater and/or flashing lights) both eyes discussed.   May use artificial tears up to 4 times daily both eyes. (Refresh Tears, Systane Ultra/Balance, or Theratears)  Call in January 2024 for an appointment in May 2024 for Complete Exam    Dr. Paige [phone]          Again, thank you for allowing me to participate in the care of your patient.        Sincerely,        Ravindra Paige MD

## 2023-05-05 NOTE — PROGRESS NOTES
Current Eye Medications:  systane both eyes twice a day or more.     Subjective:  Comprehensive Eye Exam.  She has been complaining for a long time of a delay in focusing when she looks in the distance, after reading for an extended amount of time.  Overall, vision is good, distance and and near.      Objective:  See Ophthalmology Exam.       Assessment:  Stable eye exam.      ICD-10-CM    1. Combined forms of age-related cataract, mod, left eye  H25.812       2. Pseudophakia, od  Z96.1       3. Encounter for examination of eyes and vision with abnormal findings  Z01.01       4. Presbyopia  H52.4            Plan:  Glasses Rx given - optional  Possible clouding of posterior capsule right eye discussed.   Possible posterior vitreous detachment (sudden onset large floater and/or flashing lights) both eyes discussed.   May use artificial tears up to 4 times daily both eyes. (Refresh Tears, Systane Ultra/Balance, or Theratears)  Call in January 2024 for an appointment in May 2024 for Complete Exam    Dr. Paige [phone]

## 2023-07-09 ENCOUNTER — HEALTH MAINTENANCE LETTER (OUTPATIENT)
Age: 88
End: 2023-07-09

## 2024-05-08 ENCOUNTER — OFFICE VISIT (OUTPATIENT)
Dept: OPHTHALMOLOGY | Facility: CLINIC | Age: 89
End: 2024-05-08
Payer: COMMERCIAL

## 2024-05-08 DIAGNOSIS — H52.4 PRESBYOPIA: ICD-10-CM

## 2024-05-08 DIAGNOSIS — H25.812 COMBINED FORMS OF AGE-RELATED CATARACT OF LEFT EYE: Primary | ICD-10-CM

## 2024-05-08 DIAGNOSIS — Z96.1 PSEUDOPHAKIA: ICD-10-CM

## 2024-05-08 DIAGNOSIS — H43.811 POSTERIOR VITREOUS DETACHMENT, RIGHT EYE: ICD-10-CM

## 2024-05-08 DIAGNOSIS — Z01.01 ENCOUNTER FOR EXAMINATION OF EYES AND VISION WITH ABNORMAL FINDINGS: ICD-10-CM

## 2024-05-08 PROCEDURE — 92015 DETERMINE REFRACTIVE STATE: CPT | Performed by: OPHTHALMOLOGY

## 2024-05-08 PROCEDURE — 92014 COMPRE OPH EXAM EST PT 1/>: CPT | Performed by: OPHTHALMOLOGY

## 2024-05-08 ASSESSMENT — REFRACTION_MANIFEST
OD_CYLINDER: +0.75
OD_ADD: +3.00
OS_ADD: +3.00
OS_CYLINDER: +1.50
OS_SPHERE: +0.50
OS_AXIS: 019
OD_SPHERE: -1.00
OD_AXIS: 148

## 2024-05-08 ASSESSMENT — VISUAL ACUITY
OS_CC+: -3
METHOD: SNELLEN - LINEAR
OD_CC+: -2
OD_CC: 20/20
CORRECTION_TYPE: GLASSES
OS_CC: 20/25
OD_CC: J1+
OS_CC: J1

## 2024-05-08 ASSESSMENT — EXTERNAL EXAM - RIGHT EYE: OD_EXAM: MILD BROW

## 2024-05-08 ASSESSMENT — REFRACTION_WEARINGRX
OD_ADD: +3.25
OD_SPHERE: -0.75
OD_AXIS: 139
OS_CYLINDER: +1.25
SPECS_TYPE: BIFOCAL
OD_CYLINDER: +0.25
OS_AXIS: 018
OS_ADD: +3.25
OS_SPHERE: +0.50

## 2024-05-08 ASSESSMENT — SLIT LAMP EXAM - LIDS
COMMENTS: 2+ DERMATOCHALASIS - UPPER LID
COMMENTS: 2+ DERMATOCHALASIS - UPPER LID

## 2024-05-08 ASSESSMENT — CUP TO DISC RATIO
OS_RATIO: 0.4
OD_RATIO: 0.4

## 2024-05-08 ASSESSMENT — CONF VISUAL FIELD
OS_SUPERIOR_NASAL_RESTRICTION: 0
OS_SUPERIOR_TEMPORAL_RESTRICTION: 0
OS_INFERIOR_NASAL_RESTRICTION: 0
OD_INFERIOR_NASAL_RESTRICTION: 0
OD_INFERIOR_TEMPORAL_RESTRICTION: 0
OS_INFERIOR_TEMPORAL_RESTRICTION: 0
OD_SUPERIOR_NASAL_RESTRICTION: 0
OD_SUPERIOR_TEMPORAL_RESTRICTION: 0
OD_NORMAL: 1
OS_NORMAL: 1

## 2024-05-08 ASSESSMENT — TONOMETRY
IOP_METHOD: APPLANATION
OS_IOP_MMHG: 14
OD_IOP_MMHG: 15

## 2024-05-08 ASSESSMENT — EXTERNAL EXAM - LEFT EYE: OS_EXAM: MILD BROW

## 2024-05-08 NOTE — PATIENT INSTRUCTIONS
"Glasses prescription given - optional    May use artificial tears up to four times a day (like Refresh Optive, Systane Balance, or TheraTears. Avoid \"get the red out\" drops and generic artifical tears).     Possible clouding of posterior capsule right eye discussed.     Possible posterior vitreous detachment (sudden onset large floater and/or flashing lights) left eye discussed.     Call in January 2025 for an appointment in May 2025 for Complete Exam    Dr. Paige [phone]    "

## 2024-05-08 NOTE — PROGRESS NOTES
" Current Eye Medications:  systane both eyes twice a day.     Subjective:  Comprehensive Eye Exam.  No vision changes or concerns.     Objective:  See Ophthalmology Exam.       Assessment:  Stable eye exam.      ICD-10-CM    1. Combined forms of age-related cataract, mod, left eye  H25.812       2. Pseudophakia, od  Z96.1       3. Posterior vitreous detachment, right eye  H43.811       4. Encounter for examination of eyes and vision with abnormal findings  Z01.01       5. Presbyopia  H52.4            Plan:  Glasses prescription given - optional    May use artificial tears up to four times a day (like Refresh Optive, Systane Balance, or TheraTears. Avoid \"get the red out\" drops and generic artifical tears).     Possible clouding of posterior capsule right eye discussed.     Possible posterior vitreous detachment (sudden onset large floater and/or flashing lights) left eye discussed.     Call in January 2025 for an appointment in May 2025 for Complete Exam    Dr. Paige [phone]       "

## 2024-05-08 NOTE — LETTER
"    5/8/2024         RE: Ana Garner  [address]  [place] [address]        Dear Colleague,    Thank you for referring your patient, Ana Garner, to the [facility]. Please see a copy of my visit note below.     Current Eye Medications:  systane both eyes twice a day.     Subjective:  Comprehensive Eye Exam.  No vision changes or concerns.     Objective:  See Ophthalmology Exam.       Assessment:  Stable eye exam.      ICD-10-CM    1. Combined forms of age-related cataract, mod, left eye  H25.812       2. Pseudophakia, od  Z96.1       3. Posterior vitreous detachment, right eye  H43.811       4. Encounter for examination of eyes and vision with abnormal findings  Z01.01       5. Presbyopia  H52.4            Plan:  Glasses prescription given - optional    May use artificial tears up to four times a day (like Refresh Optive, Systane Balance, or TheraTears. Avoid \"get the red out\" drops and generic artifical tears).     Possible clouding of posterior capsule right eye discussed.     Possible posterior vitreous detachment (sudden onset large floater and/or flashing lights) left eye discussed.     Call in January 2025 for an appointment in May 2025 for Complete Exam    Dr. Paige [phone]         Again, thank you for allowing me to participate in the care of your patient.        Sincerely,        Ravindra Paige MD  " Medication(s) Requested: Gabapentin 300 mg  2 caps nightly  Last office visit: 12/21/21  fuv scheduled 1/3/23  Last refill: Dispensed 11/7/22  #60  Is the patient due for refill of this medication(s): Yes  PDMP review: Criteria met. Forwarded to Physician/RANJEET for signature.      Clothing

## 2024-06-17 ENCOUNTER — TELEPHONE (OUTPATIENT)
Dept: OPHTHALMOLOGY | Facility: CLINIC | Age: 89
End: 2024-06-17
Payer: COMMERCIAL

## 2024-06-17 NOTE — TELEPHONE ENCOUNTER
[facility] Call Center    Phone Message    May a detailed message be left on voicemail: yes     Reason for Call: Form or Letter   Type or form/letter needing completion: Glasses RX from 5/8 with Dr Paige  Provider: Dr Paige  Date form needed: ASAP  Once completed: Mail form to Name: Ana Garner, at Address: [address]    [place] [address]     Please mail glasses RX to patient ASAP since her glasses broke from a fall patient had. Thank you.    Action Taken: Other: [facility]    Travel Screening: Not Applicable

## 2024-08-31 ENCOUNTER — HEALTH MAINTENANCE LETTER (OUTPATIENT)
Age: 89
End: 2024-08-31